# Patient Record
Sex: FEMALE | Race: WHITE | Employment: FULL TIME
[De-identification: names, ages, dates, MRNs, and addresses within clinical notes are randomized per-mention and may not be internally consistent; named-entity substitution may affect disease eponyms.]

---

## 2017-08-19 ENCOUNTER — HEALTH MAINTENANCE LETTER (OUTPATIENT)
Age: 37
End: 2017-08-19

## 2018-07-21 ENCOUNTER — HOSPITAL ENCOUNTER (EMERGENCY)
Facility: CLINIC | Age: 38
Discharge: HOME | End: 2018-07-21
Attending: NURSE PRACTITIONER | Admitting: NURSE PRACTITIONER

## 2018-07-21 VITALS
DIASTOLIC BLOOD PRESSURE: 90 MMHG | OXYGEN SATURATION: 98 % | HEART RATE: 93 BPM | TEMPERATURE: 98.6 F | SYSTOLIC BLOOD PRESSURE: 133 MMHG | RESPIRATION RATE: 16 BRPM

## 2018-07-21 DIAGNOSIS — M79.89 FINGER SWELLING: ICD-10-CM

## 2018-07-21 PROCEDURE — 99282 EMERGENCY DEPT VISIT SF MDM: CPT

## 2018-07-21 RX ORDER — SERTRALINE HYDROCHLORIDE 100 MG/1
100 TABLET, FILM COATED ORAL
COMMUNITY
Start: 2018-04-09

## 2018-07-21 RX ORDER — DEXTROAMPHETAMINE SACCHARATE, AMPHETAMINE ASPARTATE MONOHYDRATE, DEXTROAMPHETAMINE SULFATE AND AMPHETAMINE SULFATE 5; 5; 5; 5 MG/1; MG/1; MG/1; MG/1
20 CAPSULE, EXTENDED RELEASE ORAL
COMMUNITY
Start: 2018-06-24

## 2018-07-21 RX ORDER — ESTRADIOL 0.1 MG/D
FILM, EXTENDED RELEASE TRANSDERMAL
COMMUNITY
Start: 2018-03-25

## 2018-07-21 NOTE — ED AVS SNAPSHOT
[facility] Emergency Department    [address]    [facility] [address]    Phone:  [phone]    Fax:  [phone]                                       Caroline Zamarripa   MRN: [id number]    Department:  [facility] Emergency Department   Date of Visit:  7/21/2018           Patient Information     Date Of Birth          1980        Your diagnoses for this visit were:     Finger swelling        You were seen by Uriah Guerra APRN CNP.      Follow-up Information     Follow up with Candice Naqvi In 3 days.    Why:  if continuned symptoms or sooner if worsening    Contact information:    [address] [address] [phone]          Discharge Instructions       Don't wear rings on that finger until they are resized or swelling is completely resovled.        24 Hour Appointment Hotline       To make an appointment at any [facility] clinic, call [phone] ([phone]). If you don't have a family doctor or clinic, we will help you find one. [facility] clinics are conveniently located to serve the needs of you and your family.             Review of your medicines      Our records show that you are taking the medicines listed below. If these are incorrect, please call your family doctor or clinic.        Dose / Directions Last dose taken    amphetamine-dextroamphetamine 20 MG per 24 hr capsule   Commonly known as:  ADDERALL XR   Dose:  20 mg        Take 20 mg by mouth   Refills:  0        estradiol 0.1 MG/24HR BIW patch   Commonly known as:  VIVELLE-DOT        Refills:  0        progesterone 100 MG capsule   Commonly known as:  PROMETRIUM        Refills:  0        sertraline 100 MG tablet   Commonly known as:  ZOLOFT   Dose:  100 mg        Take 100 mg by mouth   Refills:  0                Orders Needing Specimen Collection     None      Pending Results     No orders found from 7/19/2018 to 7/22/2018.            Pending  Culture Results     No orders found from 7/19/2018 to 7/22/2018.            Pending Results Instructions     If you had any lab results that were not finalized at the time of your Discharge, you can call the ED Lab Result RN at [phone]. You will be contacted by this team for any positive Lab results or changes in treatment. The nurses are available 7 days a week from 10A to 6:30P.  You can leave a message 24 hours per day and they will return your call.        Test Results From Your Hospital Stay               Clinical Quality Measure: Blood Pressure Screening     Your blood pressure was checked while you were in the emergency department today. The last reading we obtained was  BP: 133/90 . Please read the guidelines below about what these numbers mean and what you should do about them.  If your systolic blood pressure (the top number) is less than 120 and your diastolic blood pressure (the bottom number) is less than 80, then your blood pressure is normal. There is nothing more that you need to do about it.  If your systolic blood pressure (the top number) is 120-139 or your diastolic blood pressure (the bottom number) is 80-89, your blood pressure may be higher than it should be. You should have your blood pressure rechecked within a year by a primary care provider.  If your systolic blood pressure (the top number) is 140 or greater or your diastolic blood pressure (the bottom number) is 90 or greater, you may have high blood pressure. High blood pressure is treatable, but if left untreated over time it can put you at risk for heart attack, stroke, or kidney failure. You should have your blood pressure rechecked by a primary care provider within the next 4 weeks.  If your provider in the emergency department today gave you specific instructions to follow-up with your doctor or provider even sooner than that, you should follow that instruction and not wait for up to 4 weeks for your follow-up visit.         Thank you for choosing [facility]       Thank you for choosing [facility] for your care. Our goal is always to provide you with excellent care. Hearing back from our patients is one way we can continue to improve our services. Please take a few minutes to complete the written survey that you may receive in the mail after you visit with us. Thank you!        [employer] Information     [email or web address] gives you secure access to your electronic health record. If you see a primary care provider, you can also send messages to your care team and make appointments. If you have questions, please call your primary care clinic.  If you do not have a primary care provider, please call [phone] and they will assist you.        Care EveryWhere ID     This is your Care EveryWhere ID. This could be used by other organizations to access your [facility] medical records  [id number]        Equal Access to Services     CHARLOTTE NEWMAN : Melanie Sanford, shahram marks, tina serna. So [facility] [phone].    ATENCIÓN: Si habla español, tiene a beasley disposición servicios gratuitos de asistencia lingüística. Llame al [phone].    We comply with applicable federal civil rights laws and [place] laws. We do not discriminate on the basis of race, color, national origin, age, disability, sex, sexual orientation, or gender identity.            After Visit Summary       This is your record. Keep this with you and show to your community pharmacist(s) and doctor(s) at your next visit.

## 2018-07-21 NOTE — ED AVS SNAPSHOT
[facility] Emergency Department    [address]    [facility] [address]    Phone:  [phone]    Fax:  [phone]                                       Caroline Zamarripa   MRN: [id number]    Department:  [facility] Emergency Department   Date of Visit:  7/21/2018           After Visit Summary Signature Page     I have received my discharge instructions, and my questions have been answered. I have discussed any challenges I see with this plan with the nurse or doctor.    ..........................................................................................................................................  Patient/Patient Representative Signature      ..........................................................................................................................................  Patient Representative Print Name and Relationship to Patient    ..................................................               ................................................  Date                                            Time    ..........................................................................................................................................  Reviewed by Signature/Title    ...................................................              ..............................................  Date                                                            Time

## 2018-07-22 NOTE — ED PROVIDER NOTES
History     Chief Complaint:    Rings stuck on finger      HPI   Caroline Zamarripa is a 37 year old female who presents to the ED today after getting rings stuck on her finger. The patient states that she had a previous injury to her left ring finger and today tried to put three rings back on that finger for the first time in a few months. Due to the injury, her finger as larger than it was in the past, which caused her rings to get stuck on her finger. She states that she tried icing the area, holding her hand over her head, and using olive oil to get the rings off. She presents to the ED today with concerns for the swelling to her finger, as well as for help removing these rings.      Allergies:  No known drug allergies.      Medications:    Adderall   Estradiol patch   Prometrium   Zoloft      Past Medical History:    Allergic rhinitis   Depressive disorder     Past Surgical History:    Bilateral leg surgeries     Family History:    Alcohol/drug   Arthritis   Neurologic disorder   Respiratory     Social History:  Marital Status:    Presents to the ED alone   Tobacco Use: never smoker   Alcohol Use: yes   PCP: Candice Naqvi     Review of Systems   Skin:        Positive for rings stuck on her left ring finger.   Positive for finger swelling.    All other systems reviewed and are negative.      Physical Exam   First Vitals:  BP: 133/90  Pulse: 93  Temp: 98.6  F (37  C)  Resp: 16  SpO2: 98 %      Physical Exam  General: Alert, No obvious discomfort, well kept   HENT:  Normal voice, No lymphadenopathy  Eyes:  The pupils are equal, round, and reactive to light, Conjunctiva normal, No scleral icterus   Neck:  Normal range of motion  CV:  Normal Pulses  Resp:  Non-labored, No cough  MS:  Left ring finger with mild edema just distal to ring his and around PIP joint.  Normal distal sensation and circulation.  However patient's sensation is significantly decreased since a nerve injury earlier this  year.  Skin:  No rash or acute skin lesions noted  Neuro: Speech is normal and fluent  Psych:  Awake. Alert.  Normal affect.  Appropriate interactions. Good eye contact          Emergency Department Course   Procedures:  Procedure: Ring Removal  Procedure note: Ring removal (x3) from left ring finger with umbilical tape compression    Emergency Department Course:  Nursing notes and vitals reviewed.  (2151) I performed an exam of the patient as documented above.    I performed a ring removal procedure, as noted above.   Findings and plan explained to the patient. Patient discharged home with instructions regarding supportive care, medications, and reasons to return. The importance of close follow-up was reviewed.    Impression & Plan    Medical Decision Making:  Caroline Zamarripa is a 37 year old female who presents with wedding ring is stuck on left ring finger.  She injured this finger earlier this year and had to have it repaired.  It has been larger in size since that time.  She put her rings on for the first time today in several months.  Throughout the day she had been studying and did not notice that her finger was starting to swell.  She was unable to get them off at home after several attempts she presented for evaluation.  I was able to easily remove the rings as above.  Patient is advised against wearing rings until they are either resized or finger has returned to normal size.  There is no laceration.  She appears to be safe and appropriate for outpatient management follow-up.  She will follow-up with hand surgeon regarding her nerve injury as previously planned.  She is discharged home.    Diagnosis:    ICD-10-CM    1. Finger swelling M79.89        Disposition:  discharged to home    Laura SARKAR, am serving as a scribe on 7/21/2018 at 9:51 PM to personally document services performed by ELIU Lr, CNP based on my observations and the provider's statements to me.   7/21/2018   [place]  [facility] EMERGENCY DEPARTMENT       Uriah Guerra, APRN CNP  07/21/18 [id number]

## 2018-07-22 NOTE — ED TRIAGE NOTES
Pt reports she has 3 rings stuck on her left ring finger. Pt had an injury to that finger in April and tried to put the rings back on today for the first time and they got stuck. Pt tried icing it and holding it above her head with no success.

## 2019-11-06 ENCOUNTER — HEALTH MAINTENANCE LETTER (OUTPATIENT)
Age: 39
End: 2019-11-06

## 2020-11-29 ENCOUNTER — HEALTH MAINTENANCE LETTER (OUTPATIENT)
Age: 40
End: 2020-11-29

## 2021-09-19 ENCOUNTER — HEALTH MAINTENANCE LETTER (OUTPATIENT)
Age: 41
End: 2021-09-19

## 2022-01-09 ENCOUNTER — HEALTH MAINTENANCE LETTER (OUTPATIENT)
Age: 42
End: 2022-01-09

## 2022-11-21 ENCOUNTER — HEALTH MAINTENANCE LETTER (OUTPATIENT)
Age: 42
End: 2022-11-21

## 2023-04-16 ENCOUNTER — HEALTH MAINTENANCE LETTER (OUTPATIENT)
Age: 43
End: 2023-04-16

## 2024-02-04 ENCOUNTER — HEALTH MAINTENANCE LETTER (OUTPATIENT)
Age: 44
End: 2024-02-04

## 2025-03-29 ENCOUNTER — HEALTH MAINTENANCE LETTER (OUTPATIENT)
Age: 45
End: 2025-03-29